# Patient Record
Sex: FEMALE | Race: WHITE
[De-identification: names, ages, dates, MRNs, and addresses within clinical notes are randomized per-mention and may not be internally consistent; named-entity substitution may affect disease eponyms.]

---

## 2021-03-25 ENCOUNTER — HOSPITAL ENCOUNTER (EMERGENCY)
Dept: HOSPITAL 35 - ER | Age: 51
End: 2021-03-25
Payer: COMMERCIAL

## 2021-03-25 DIAGNOSIS — I46.9: Primary | ICD-10-CM

## 2021-03-25 LAB
%HYPO/RBC NFR BLD AUTO: (no result) %
ALBUMIN SERPL-MCNC: 1.5 G/DL (ref 3.4–5)
ALT SERPL-CCNC: 31 U/L (ref 14–59)
ANION GAP SERPL CALC-SCNC: 18 MMOL/L (ref 7–16)
ANISOCYTOSIS BLD QL SMEAR: (no result)
AST SERPL-CCNC: 78 U/L (ref 15–37)
BASOPHILS NFR BLD AUTO: 1 % (ref 0–2)
BE(VIVO): -22.2 MMOL/L
BILIRUB SERPL-MCNC: 0.3 MG/DL (ref 0.2–1)
BUN SERPL-MCNC: 22 MG/DL (ref 7–18)
CALCIUM SERPL-MCNC: 8.2 MG/DL (ref 8.5–10.1)
CHLORIDE SERPL-SCNC: 105 MMOL/L (ref 98–107)
CO2 SERPL-SCNC: 15 MMOL/L (ref 21–32)
CREAT SERPL-MCNC: 1.2 MG/DL (ref 0.6–1)
EOSINOPHIL NFR BLD: 2 % (ref 0–3)
ERYTHROCYTE [DISTWIDTH] IN BLOOD BY AUTOMATED COUNT: 24.9 % (ref 10.5–14.5)
GLUCOSE SERPL-MCNC: 300 MG/DL (ref 74–106)
GRANULOCYTES NFR BLD MANUAL: 63 % (ref 36–66)
HCO3 BLD-SCNC: 9.2 MMOL/L (ref 22–26)
HCT VFR BLD CALC: 29.8 % (ref 37–47)
HGB BLD-MCNC: 7.8 GM/DL (ref 12–15)
LYMPHOCYTES NFR BLD AUTO: 29 % (ref 24–44)
MCH RBC QN AUTO: 20 PG (ref 26–34)
MCHC RBC AUTO-ENTMCNC: 26.1 G/DL (ref 28–37)
MCV RBC: 76.7 FL (ref 80–100)
MICROCYTES: (no result)
MONOCYTES NFR BLD: 1 % (ref 1–8)
NEUTROPHILS # BLD: 13.1 THOU/UL (ref 1.4–8.2)
NEUTS BAND NFR BLD: 1 % (ref 0–8)
PCO2 BLD: 47.2 MMHG (ref 35–45)
PLATELET # BLD: 101 THOU/UL (ref 150–400)
PO2 BLD: 193.9 MMHG (ref 80–100)
POLYCHROMASIA BLD QL SMEAR: SLIGHT
POTASSIUM SERPL-SCNC: 4.7 MMOL/L (ref 3.5–5.1)
PROT SERPL-MCNC: 4.2 G/DL (ref 6.4–8.2)
RBC # BLD AUTO: 3.89 MIL/UL (ref 4.2–5)
SALICYLATES SERPL-MCNC: 4.8 MG/DL (ref 2.8–20)
SODIUM SERPL-SCNC: 138 MMOL/L (ref 136–145)
TROPONIN I SERPL-MCNC: 0.4 NG/ML (ref ?–0.06)
VARIANT LYMPHS NFR BLD MANUAL: 3 %
WBC # BLD AUTO: 20.4 THOU/UL (ref 4–11)

## 2021-03-26 NOTE — EKG
[place] [address] [facility] [place]  [address]
Phone:  [phone]                    ELECTROCARDIOGRAM REPORT      
_______________________________________________________________________________
 
Name:       MINDI ALVAREZ             [facility] #:                     [facility]      [id number]     Account #:      [id number]  
Admission:  21    Attend Phys:                          
Discharge:  21    Date of Birth:  [birth year]  
                                                          Report #: [id number]
   [id number]
_______________________________________________________________________________
                          [facility]
                                       
Test Date:    2021               Test Time:    15:58:45
Pat Name:     MINDI ALVAREZ        Department:   
Patient ID:   [id number]            Room:          
Gender:       F                        Technician:   JCHAIREPB
:          1970               Requested By: Mark Lancaster
Order Number: [id number] MD:   Celso Guerra
                                 Measurements
Intervals                              Axis          
Rate:         135                      P:            200
KS:           85                       QRS:          119
QRSD:         163                      T:            -56
QT:           292                                    
QTc:          438                                    
                           Interpretive Statements
Sinus or ectopic atrial tachycardia
Ischemic changes lageral leads
Ventricular premature complex
No previous ECG available for comparison
Electronically Signed On 3- 7:15:51 CDT by Celso Guerra
[email or web address]
 
 
 
 
 
 
 
 
 
 
 
 
 
 
 
 
 
 
 
 
 
  <ELECTRONICALLY SIGNED>
   By: Celso Guerra MD, [facility]    
  21     0715
D: 21 1558                           _____________________________________
T: 21 1558                           Celso Guerra MD, FACC      /EPI